# Patient Record
Sex: FEMALE | URBAN - METROPOLITAN AREA
[De-identification: names, ages, dates, MRNs, and addresses within clinical notes are randomized per-mention and may not be internally consistent; named-entity substitution may affect disease eponyms.]

---

## 2022-02-23 ENCOUNTER — APPOINTMENT (RX ONLY)
Dept: URBAN - METROPOLITAN AREA CLINIC 45 | Facility: CLINIC | Age: 82
Setting detail: DERMATOLOGY
End: 2022-02-23

## 2022-02-23 DIAGNOSIS — D18.0 HEMANGIOMA: ICD-10-CM

## 2022-02-23 DIAGNOSIS — L81.4 OTHER MELANIN HYPERPIGMENTATION: ICD-10-CM

## 2022-02-23 DIAGNOSIS — D22 MELANOCYTIC NEVI: ICD-10-CM

## 2022-02-23 DIAGNOSIS — M67.4 GANGLION: ICD-10-CM | Status: WELL CONTROLLED

## 2022-02-23 DIAGNOSIS — L82.1 OTHER SEBORRHEIC KERATOSIS: ICD-10-CM

## 2022-02-23 PROBLEM — D22.5 MELANOCYTIC NEVI OF TRUNK: Status: ACTIVE | Noted: 2022-02-23

## 2022-02-23 PROBLEM — M67.431 GANGLION, RIGHT WRIST: Status: ACTIVE | Noted: 2022-02-23

## 2022-02-23 PROBLEM — M67.40 GANGLION, UNSPECIFIED SITE: Status: ACTIVE | Noted: 2022-02-23

## 2022-02-23 PROBLEM — D18.01 HEMANGIOMA OF SKIN AND SUBCUTANEOUS TISSUE: Status: ACTIVE | Noted: 2022-02-23

## 2022-02-23 PROBLEM — D48.5 NEOPLASM OF UNCERTAIN BEHAVIOR OF SKIN: Status: ACTIVE | Noted: 2022-02-23

## 2022-02-23 PROCEDURE — ? ADDITIONAL NOTES

## 2022-02-23 PROCEDURE — ? DEFER

## 2022-02-23 PROCEDURE — 99203 OFFICE O/P NEW LOW 30 MIN: CPT | Mod: 25

## 2022-02-23 PROCEDURE — ? SUNSCREEN RECOMMENDATIONS

## 2022-02-23 PROCEDURE — ? COUNSELING

## 2022-02-23 PROCEDURE — ? BIOPSY BY SHAVE METHOD

## 2022-02-23 PROCEDURE — 11102 TANGNTL BX SKIN SINGLE LES: CPT

## 2022-02-23 PROCEDURE — ? FULL BODY SKIN EXAM

## 2022-02-23 ASSESSMENT — LOCATION ZONE DERM
LOCATION ZONE: LEG
LOCATION ZONE: ARM
LOCATION ZONE: TRUNK

## 2022-02-23 ASSESSMENT — LOCATION SIMPLE DESCRIPTION DERM
LOCATION SIMPLE: RIGHT WRIST
LOCATION SIMPLE: RIGHT ARM
LOCATION SIMPLE: LEFT CALF
LOCATION SIMPLE: ABDOMEN

## 2022-02-23 ASSESSMENT — LOCATION DETAILED DESCRIPTION DERM
LOCATION DETAILED: LEFT PROXIMAL CALF
LOCATION DETAILED: RIGHT RADIAL VENTRAL WRIST
LOCATION DETAILED: EPIGASTRIC SKIN
LOCATION DETAILED: PERIUMBILICAL SKIN
LOCATION DETAILED: RIGHT VENTRAL WRIST

## 2022-02-23 NOTE — PROCEDURE: BIOPSY BY SHAVE METHOD
Detail Level: Simple
Depth Of Biopsy: dermis
Was A Bandage Applied: Yes
Size Of Lesion In Cm: 0
Biopsy Type: H and E
Biopsy Method: Personna blade
Anesthesia Type: 1% lidocaine with epinephrine and a 1:10 solution of 8.4% sodium bicarbonate
Anesthesia Volume In Cc (Will Not Render If 0): 1
Hemostasis: Pressure and Electrodesiccation
Wound Care: Aquaphor
Dressing: pressure dressing
Destruction After The Procedure: No
Type Of Destruction Used: Curettage
Curettage Text: The wound bed was treated with curettage after the biopsy was performed.
Cryotherapy Text: The wound bed was treated with cryotherapy after the biopsy was performed.
Electrodesiccation Text: The wound bed was treated with electrodesiccation after the biopsy was performed.
Electrodesiccation And Curettage Text: The wound bed was treated with electrodesiccation and curettage after the biopsy was performed.
Silver Nitrate Text: The wound bed was treated with silver nitrate after the biopsy was performed.
Consent: Written consent was obtained and risks were reviewed including but not limited to scarring, infection, bleeding, crusting, incomplete removal, nerve damage and allergy to anesthesia.
Post-Care Instructions: I reviewed with the patient in detail post-care instructions. Patient is to keep the biopsy site dry.  After 24 hours, patient may wash the biopsy area with gentle soap and water, and then apply Aquaphor or vaseline twice daily until healed.
Notification Instructions: Patient will be notified of biopsy results in 1-2 weeks. However, patient instructed to call the office if not contacted within 2 weeks.
Billing Type: Third-Party Bill
Information: Selecting Yes will display possible errors in your note based on the variables you have selected. This validation is only offered as a suggestion for you. PLEASE NOTE THAT THE VALIDATION TEXT WILL BE REMOVED WHEN YOU FINALIZE YOUR NOTE. IF YOU WANT TO FAX A PRELIMINARY NOTE YOU WILL NEED TO TOGGLE THIS TO 'NO' IF YOU DO NOT WANT IT IN YOUR FAXED NOTE.

## 2022-02-23 NOTE — PROCEDURE: DEFER
Reason To Defer Override: patient will be referred out to hand surgeon
Introduction Text (Please End With A Colon): The following procedure was deferred: Removal
Detail Level: Simple

## 2023-02-21 ENCOUNTER — APPOINTMENT (RX ONLY)
Dept: URBAN - METROPOLITAN AREA CLINIC 45 | Facility: CLINIC | Age: 83
Setting detail: DERMATOLOGY
End: 2023-02-21

## 2023-02-21 DIAGNOSIS — L82.1 OTHER SEBORRHEIC KERATOSIS: ICD-10-CM

## 2023-02-21 DIAGNOSIS — L81.4 OTHER MELANIN HYPERPIGMENTATION: ICD-10-CM

## 2023-02-21 DIAGNOSIS — D18.0 HEMANGIOMA: ICD-10-CM

## 2023-02-21 DIAGNOSIS — H61.03 CHONDRITIS OF EXTERNAL EAR: ICD-10-CM | Status: INADEQUATELY CONTROLLED

## 2023-02-21 DIAGNOSIS — D22 MELANOCYTIC NEVI: ICD-10-CM

## 2023-02-21 PROBLEM — H61.031 CHONDRITIS OF RIGHT EXTERNAL EAR: Status: ACTIVE | Noted: 2023-02-21

## 2023-02-21 PROBLEM — D22.5 MELANOCYTIC NEVI OF TRUNK: Status: ACTIVE | Noted: 2023-02-21

## 2023-02-21 PROBLEM — D18.01 HEMANGIOMA OF SKIN AND SUBCUTANEOUS TISSUE: Status: ACTIVE | Noted: 2023-02-21

## 2023-02-21 PROCEDURE — 99213 OFFICE O/P EST LOW 20 MIN: CPT | Mod: 25

## 2023-02-21 PROCEDURE — ? INTRALESIONAL KENALOG

## 2023-02-21 PROCEDURE — ? COUNSELING

## 2023-02-21 PROCEDURE — ? SUNSCREEN RECOMMENDATIONS

## 2023-02-21 PROCEDURE — 11900 INJECT SKIN LESIONS </W 7: CPT

## 2023-02-21 ASSESSMENT — LOCATION DETAILED DESCRIPTION DERM
LOCATION DETAILED: RIGHT SUPERIOR HELIX
LOCATION DETAILED: LEFT PROXIMAL CALF
LOCATION DETAILED: PERIUMBILICAL SKIN
LOCATION DETAILED: EPIGASTRIC SKIN

## 2023-02-21 ASSESSMENT — LOCATION ZONE DERM
LOCATION ZONE: TRUNK
LOCATION ZONE: LEG
LOCATION ZONE: EAR

## 2023-02-21 ASSESSMENT — LOCATION SIMPLE DESCRIPTION DERM
LOCATION SIMPLE: RIGHT EAR
LOCATION SIMPLE: ABDOMEN
LOCATION SIMPLE: LEFT CALF

## 2023-02-21 NOTE — HPI: EVALUATION OF SKIN LESION(S)
What Type Of Note Output Would You Prefer (Optional)?: Bullet Format
Hpi Title: Evaluation of Skin Lesions
Have Your Spot(S) Been Treated In The Past?: has not been treated
Additional History: Q12 - 2/23/22 last fse

## 2024-06-07 ENCOUNTER — APPOINTMENT (RX ONLY)
Dept: URBAN - METROPOLITAN AREA CLINIC 45 | Facility: CLINIC | Age: 84
Setting detail: DERMATOLOGY
End: 2024-06-07

## 2024-06-07 DIAGNOSIS — L82.1 OTHER SEBORRHEIC KERATOSIS: ICD-10-CM

## 2024-06-07 DIAGNOSIS — L81.4 OTHER MELANIN HYPERPIGMENTATION: ICD-10-CM

## 2024-06-07 DIAGNOSIS — H61.03 CHONDRITIS OF EXTERNAL EAR: ICD-10-CM | Status: RESOLVED

## 2024-06-07 DIAGNOSIS — D18.0 HEMANGIOMA: ICD-10-CM

## 2024-06-07 DIAGNOSIS — D22 MELANOCYTIC NEVI: ICD-10-CM

## 2024-06-07 PROBLEM — D18.01 HEMANGIOMA OF SKIN AND SUBCUTANEOUS TISSUE: Status: ACTIVE | Noted: 2024-06-07

## 2024-06-07 PROBLEM — D22.5 MELANOCYTIC NEVI OF TRUNK: Status: ACTIVE | Noted: 2024-06-07

## 2024-06-07 PROBLEM — H61.031 CHONDRITIS OF RIGHT EXTERNAL EAR: Status: ACTIVE | Noted: 2024-06-07

## 2024-06-07 PROCEDURE — ? FULL BODY SKIN EXAM

## 2024-06-07 PROCEDURE — ? COUNSELING

## 2024-06-07 PROCEDURE — ? TREATMENT REGIMEN

## 2024-06-07 PROCEDURE — 99213 OFFICE O/P EST LOW 20 MIN: CPT

## 2024-06-07 ASSESSMENT — LOCATION SIMPLE DESCRIPTION DERM
LOCATION SIMPLE: RIGHT FOREARM
LOCATION SIMPLE: RIGHT UPPER BACK
LOCATION SIMPLE: RIGHT SHOULDER
LOCATION SIMPLE: LEFT FOREARM
LOCATION SIMPLE: ABDOMEN
LOCATION SIMPLE: RIGHT EAR
LOCATION SIMPLE: UPPER BACK
LOCATION SIMPLE: LEFT SHOULDER

## 2024-06-07 ASSESSMENT — LOCATION DETAILED DESCRIPTION DERM
LOCATION DETAILED: RIGHT POSTERIOR SHOULDER
LOCATION DETAILED: RIGHT MEDIAL UPPER BACK
LOCATION DETAILED: RIGHT DISTAL DORSAL FOREARM
LOCATION DETAILED: RIGHT SUPERIOR HELIX
LOCATION DETAILED: EPIGASTRIC SKIN
LOCATION DETAILED: LEFT DISTAL DORSAL FOREARM
LOCATION DETAILED: LEFT POSTERIOR SHOULDER
LOCATION DETAILED: INFERIOR THORACIC SPINE

## 2024-06-07 ASSESSMENT — LOCATION ZONE DERM
LOCATION ZONE: TRUNK
LOCATION ZONE: EAR
LOCATION ZONE: ARM

## 2024-06-07 NOTE — PROCEDURE: COUNSELING
Detail Level: Generalized
Detail Level: Zone
Sunscreen Recommendations: Recommend daily sun protection with SPF 30+\\nPhysical sunblock containing Zinc Oxide or Titanium Dioxide\\nReapply every 2 hours with prolonged sun exposure, water immersion, or sweating
Detail Level: Detailed